# Patient Record
Sex: FEMALE | Race: WHITE | NOT HISPANIC OR LATINO | Employment: OTHER | URBAN - NONMETROPOLITAN AREA
[De-identification: names, ages, dates, MRNs, and addresses within clinical notes are randomized per-mention and may not be internally consistent; named-entity substitution may affect disease eponyms.]

---

## 2021-02-25 DIAGNOSIS — Z23 IMMUNIZATION DUE: ICD-10-CM

## 2024-01-11 ENCOUNTER — OFFICE VISIT (OUTPATIENT)
Dept: ENDOCRINOLOGY | Facility: CLINIC | Age: 69
End: 2024-01-11
Payer: MEDICARE

## 2024-01-11 VITALS
OXYGEN SATURATION: 94 % | HEART RATE: 69 BPM | BODY MASS INDEX: 26.91 KG/M2 | DIASTOLIC BLOOD PRESSURE: 69 MMHG | WEIGHT: 142.4 LBS | SYSTOLIC BLOOD PRESSURE: 134 MMHG

## 2024-01-11 DIAGNOSIS — E89.0 POSTOPERATIVE HYPOTHYROIDISM: ICD-10-CM

## 2024-01-11 DIAGNOSIS — R22.1 NECK MASS: Primary | ICD-10-CM

## 2024-01-11 PROCEDURE — 1160F RVW MEDS BY RX/DR IN RCRD: CPT | Performed by: NURSE PRACTITIONER

## 2024-01-11 PROCEDURE — 99203 OFFICE O/P NEW LOW 30 MIN: CPT | Performed by: NURSE PRACTITIONER

## 2024-01-11 PROCEDURE — 1159F MED LIST DOCD IN RCRD: CPT | Performed by: NURSE PRACTITIONER

## 2024-01-11 RX ORDER — UBIDECARENONE 75 MG
50 CAPSULE ORAL DAILY
COMMUNITY

## 2024-01-11 RX ORDER — HYDROCODONE BITARTRATE AND ACETAMINOPHEN 7.5; 325 MG/1; MG/1
1 TABLET ORAL EVERY 6 HOURS PRN
COMMUNITY

## 2024-01-11 RX ORDER — LEVOTHYROXINE SODIUM 88 UG/1
88 TABLET ORAL DAILY
COMMUNITY

## 2024-01-11 RX ORDER — TRAZODONE HYDROCHLORIDE 50 MG/1
50 TABLET ORAL NIGHTLY
COMMUNITY

## 2024-01-11 RX ORDER — CLONAZEPAM 1 MG/1
1 TABLET ORAL 2 TIMES DAILY PRN
COMMUNITY

## 2024-01-11 RX ORDER — ONDANSETRON 4 MG/1
4 TABLET, FILM COATED ORAL EVERY 8 HOURS PRN
COMMUNITY

## 2024-01-11 RX ORDER — LEVOCETIRIZINE DIHYDROCHLORIDE 5 MG/1
5 TABLET, FILM COATED ORAL EVERY EVENING
COMMUNITY

## 2024-01-11 RX ORDER — ESTRADIOL 0.05 MG/D
1 PATCH TRANSDERMAL WEEKLY
COMMUNITY

## 2024-01-11 NOTE — PROGRESS NOTES
Chief Complaint   Patient presents with    Initial Evaluation    Thyroid Problem        Referring Provider  No ref. provider found     HPI   Carlene Ballard is a 68 y.o. female had concerns including Initial Evaluation and Thyroid Problem.   Seen as a new patient.  Hypothyroidism.    She had a lump noted in her neck.  She went to see a surgeon and had a biopsy.  At that time she was noted to have thyroid cancer.  She has had a total thyroidectomy.  She was seen by Dr Liriano in [place] in the past.  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  She had ALFREDO as well.  She has recently changed PCP and had an US Thyroid.  US Thyroid: 09/2023  Impression:   1. Postsurgical changes of thyroidectomy.   2. Two indeterminate hyperechoic nodules in the right thyroidectomy bed. The more heterogenous appearing nodule has vascularity associated with it. Clinical correlation with thyroid laboratory markers is recommended. If clinical markers are abnormal whole body surveillance imaging can be considered.   3. Left level 4 lymph node with preservation of the fatty hilum and normal cortex measures 0.6 x 0.6 x 0.5 cm.   She is currently taking Synthroid 88 mcg QD. She was taking 100 mcg and was having hyperthyroid symptoms.  They reduced her from 100 to 88 mcg. She is taking this daily without missing doses.  She denies any conflicting medication. She denies any compressive s/sx's.    Birth state:[place]  Previous history of radiation to face/neck: ALFREDO after thyroidectomy in [id number]  Consuming foods high in iodine: none  Family history of thyroid complications: long hx of maternal thyroid issues on medication.    The following portions of the patient's history were reviewed and updated as appropriate: allergies, current medications, past family history, past medical history, past social history, past surgical history, and problem list.    Past Medical History:   Diagnosis Date    Hyperthyroidism      Past Surgical History:   Procedure  Laterality Date    HYSTERECTOMY      REPLACEMENT TOTAL KNEE      THYROIDECTOMY        Family History   Problem Relation Age of Onset    Thyroid disease Mother     Alzheimer's disease Mother     Arthritis Mother     Arthritis Father     Heart attack Father     Diabetes Sister     Diabetes Brother       Social History     Socioeconomic History    Marital status:    Tobacco Use    Smoking status: Never   Vaping Use    Vaping Use: Never used   Substance and Sexual Activity    Alcohol use: Never    Drug use: Never      Allergies   Allergen Reactions    Prednisone Rash      Current Outpatient Medications on File Prior to Visit   Medication Sig Dispense Refill    clonazePAM (KlonoPIN) 1 MG tablet Take 1 tablet by mouth 2 (Two) Times a Day As Needed.      estradiol (CLIMARA) 0.05 MG/24HR patch Place 1 patch on the skin as directed by provider 1 (One) Time Per Week.      HYDROcodone-acetaminophen (NORCO) 7.5-325 MG per tablet Take 1 tablet by mouth Every 6 (Six) Hours As Needed for Moderate Pain.      levocetirizine (XYZAL) 5 MG tablet Take 1 tablet by mouth Every Evening.      levothyroxine (SYNTHROID, LEVOTHROID) 88 MCG tablet Take 1 tablet by mouth Daily.      ondansetron (ZOFRAN) 4 MG tablet Take 1 tablet by mouth Every 8 (Eight) Hours As Needed for Nausea or Vomiting.      traZODone (DESYREL) 50 MG tablet Take 1 tablet by mouth Every Night.      vitamin B-12 (CYANOCOBALAMIN) 100 MCG tablet Take 0.5 tablets by mouth Daily.       No current facility-administered medications on file prior to visit.      Review of Systems   Constitutional: Negative.    HENT:  Negative for trouble swallowing and voice change.    Eyes: Negative.    Endocrine: Negative.    Psychiatric/Behavioral: Negative.     All other systems reviewed and are negative.    /69 (BP Location: Right arm, Patient Position: Sitting, Cuff Size: Adult)   Pulse 69   Wt 64.6 kg (142 lb 6.4 oz)   SpO2 94%   BMI 26.91 kg/m²      Physical Exam  Vitals  "reviewed.   Constitutional:       Appearance: Normal appearance.   Eyes:      Extraocular Movements: Extraocular movements intact.   Neck:      Thyroid: No thyroid mass.      Comments: No palpable thyroid tissue.  Cardiovascular:      Rate and Rhythm: Normal rate.   Pulmonary:      Effort: Pulmonary effort is normal.   Neurological:      General: No focal deficit present.      Mental Status: She is alert and oriented to person, place, and time.   Psychiatric:         Mood and Affect: Mood normal.         Behavior: Behavior normal.         Thought Content: Thought content normal.         Judgment: Judgment normal.       Labs/Imaging  CMP  Lab Results   Component Value Date    GLUCOSE 97 01/13/2016    BUN 9 01/13/2016    CREATININE 0.70 01/13/2016    BCR 12.7 01/13/2016    K 3.7 01/13/2016    CO2 26.4 01/13/2016    CALCIUM 9.0 01/13/2016    ALBUMIN 3.9 01/13/2016    LABIL2 1.3 (L) 01/13/2016    AST 49 (H) 01/13/2016    ALT 18 01/13/2016        CBC w/DIFF   Lab Results   Component Value Date    WBC 7.7 01/13/2016    RBC 4.44 01/13/2016    HGB 12.4 01/13/2016    HCT 38.6 01/13/2016    MCV 86.9 01/13/2016    MCH 27.9 01/13/2016    MCHC 32.1 (L) 01/13/2016    RDW 14.3 01/13/2016    MPV 11.6 (H) 01/13/2016     01/13/2016    NEUTRORELPCT 50.6 01/13/2016    LYMPHORELPCT 38.9 01/13/2016    MONORELPCT 6.8 01/13/2016    EOSRELPCT 3.2 01/13/2016    BASORELPCT 0.4 01/13/2016    NEUTROABS 3.9 01/13/2016    LYMPHSABS 3.0 01/13/2016    MONOSABS 0.5 01/13/2016    EOSABS 0.3 01/13/2016    BASOSABS 0.0 01/13/2016       TSH  Lab Results   Component Value Date    TSH 0.205 (L) 12/01/2014       T4  Lab Results   Component Value Date    FREET4 1.50 12/01/2014    FREET4 1.01 04/18/2014     No results found for: \"F8FEIRJ\"    T3  No results found for: \"T3FREE\"  No results found for: \"T3MPZGQ\"    TRAb  No results found for: \"TSURCPAB\"    TPO  No results found for: \"THYROIDAB\"    No valid procedures specified.    Assessment and " Plan    Diagnoses and all orders for this visit:    1. Neck mass (Primary)  Assessment & Plan:  With her new findings of mass noted to area of thyroid bed, discussed with Dr Gillis.  He recommended obtaining a biopsy of this area.  Spoke with radiology and they would like to see a CT Neck Soft Tissue, prior to performing a biopsy.  Will obtain this and treat and follow as indicated.      Orders:  -     CT Soft Tissue Neck With & Without Contrast    2. Postoperative hypothyroidism  Assessment & Plan:  -Clinically euthyroid.  -TFT's most recent in range.  Continue with T4 88 mcg QD.  Reminded of proper administration including taking 7 pills per week on an empty stomach with no missed doses, waiting 30-60 minutes prior to other medications or food.  -Follow-up in 3 months.           Return in about 3 months (around 4/11/2024) for Follow-up appointment. The patient was instructed to contact the clinic with any interval questions or concerns.      This document has been electronically signed by HENRRY Kirby  January 16, 2024 10:34 EST   Endocrinology    Please note that portions of this document were completed with a voice recognition program. Efforts were made to edit the dictations, but occasionally words are mis-transcribed.

## 2024-01-16 PROBLEM — R22.1 NECK MASS: Status: ACTIVE | Noted: 2024-01-16

## 2024-01-16 PROBLEM — E89.0 POSTOPERATIVE HYPOTHYROIDISM: Status: ACTIVE | Noted: 2024-01-16

## 2024-01-22 ENCOUNTER — TELEPHONE (OUTPATIENT)
Dept: ENDOCRINOLOGY | Facility: CLINIC | Age: 69
End: 2024-01-22

## 2024-01-22 NOTE — TELEPHONE ENCOUNTER
Caller: WILFRID VINCENT    Relationship to patient: SELF    Best call back number: [phone]    Patient is needing: PATIENT SAID SHE MADE AN APPOINTMENT WITH CT AND THEY WERE RUDE AND NASTY TO HER. SHE IS SKEPTICAL AND WANTS TO KNOW OPTIONS AND DOESN'T WANT TO GO TO THEM. SHE WOULD LIKE A CALL BACK

## 2024-01-22 NOTE — TELEPHONE ENCOUNTER
She has had a hx of thyroid cancer.  There are 2 concerning nodular areas in the thyroid bed region.  They likely will need to be biopsied, but I spoke with radiologist and they are wanting to get a CT of the Neck prior to obtaining a biopsy, so that we have a better idea of what is there.